# Patient Record
Sex: FEMALE | Race: WHITE | NOT HISPANIC OR LATINO | Employment: FULL TIME | URBAN - NONMETROPOLITAN AREA
[De-identification: names, ages, dates, MRNs, and addresses within clinical notes are randomized per-mention and may not be internally consistent; named-entity substitution may affect disease eponyms.]

---

## 2020-11-16 ENCOUNTER — HOSPITAL ENCOUNTER (OUTPATIENT)
Dept: MAMMOGRAPHY | Facility: HOSPITAL | Age: 50
Discharge: HOME | End: 2020-11-16
Admitting: NURSE PRACTITIONER

## 2020-11-16 DIAGNOSIS — Z12.31 VISIT FOR SCREENING MAMMOGRAM: ICD-10-CM

## 2020-11-16 PROCEDURE — 77067 SCR MAMMO BI INCL CAD: CPT | Performed by: RADIOLOGY

## 2020-11-16 PROCEDURE — 77063 BREAST TOMOSYNTHESIS BI: CPT | Performed by: RADIOLOGY

## 2020-11-16 PROCEDURE — 77063 BREAST TOMOSYNTHESIS BI: CPT

## 2020-11-16 PROCEDURE — 77067 SCR MAMMO BI INCL CAD: CPT

## 2022-06-17 ENCOUNTER — HOSPITAL ENCOUNTER (OUTPATIENT)
Dept: MAMMOGRAPHY | Facility: HOSPITAL | Age: 52
Discharge: HOME | End: 2022-06-17
Admitting: OBSTETRICS & GYNECOLOGY

## 2022-06-17 DIAGNOSIS — Z12.31 VISIT FOR SCREENING MAMMOGRAM: ICD-10-CM

## 2022-06-17 PROCEDURE — 77067 SCR MAMMO BI INCL CAD: CPT | Performed by: RADIOLOGY

## 2022-06-17 PROCEDURE — 77063 BREAST TOMOSYNTHESIS BI: CPT | Performed by: RADIOLOGY

## 2022-06-17 PROCEDURE — 77063 BREAST TOMOSYNTHESIS BI: CPT

## 2022-06-17 PROCEDURE — 77067 SCR MAMMO BI INCL CAD: CPT

## 2022-09-15 ENCOUNTER — OFFICE VISIT (OUTPATIENT)
Dept: GASTROENTEROLOGY | Facility: CLINIC | Age: 52
End: 2022-09-15

## 2022-09-15 VITALS
HEART RATE: 82 BPM | WEIGHT: 181 LBS | DIASTOLIC BLOOD PRESSURE: 97 MMHG | SYSTOLIC BLOOD PRESSURE: 113 MMHG | HEIGHT: 63 IN | OXYGEN SATURATION: 98 % | BODY MASS INDEX: 32.07 KG/M2

## 2022-09-15 DIAGNOSIS — K58.0 IRRITABLE BOWEL SYNDROME WITH DIARRHEA: Primary | ICD-10-CM

## 2022-09-15 PROCEDURE — 99204 OFFICE O/P NEW MOD 45 MIN: CPT | Performed by: INTERNAL MEDICINE

## 2022-09-15 RX ORDER — ELUXADOLINE 100 MG/1
100 TABLET, FILM COATED ORAL 2 TIMES DAILY
Qty: 60 TABLET | Refills: 5 | Status: SHIPPED | OUTPATIENT
Start: 2022-09-15 | End: 2022-11-07

## 2022-09-15 RX ORDER — DICYCLOMINE HYDROCHLORIDE 10 MG/1
10 CAPSULE ORAL EVERY 8 HOURS PRN
COMMUNITY
Start: 2022-08-02 | End: 2022-11-07 | Stop reason: SDUPTHER

## 2022-09-15 RX ORDER — ERGOCALCIFEROL 1.25 MG/1
50000 CAPSULE ORAL
COMMUNITY
Start: 2022-07-29

## 2022-09-15 RX ORDER — BETAMETHASONE DIPROPIONATE 0.5 MG/G
CREAM TOPICAL EVERY 12 HOURS
COMMUNITY
Start: 2022-05-03

## 2022-09-15 RX ORDER — ALBUTEROL SULFATE 90 UG/1
2 AEROSOL, METERED RESPIRATORY (INHALATION) EVERY 4 HOURS
COMMUNITY
Start: 2022-05-03

## 2022-09-15 RX ORDER — DIPHENOXYLATE HYDROCHLORIDE AND ATROPINE SULFATE 2.5; .025 MG/1; MG/1
1 TABLET ORAL 2 TIMES DAILY PRN
COMMUNITY
Start: 2022-08-14 | End: 2022-09-21 | Stop reason: SDUPTHER

## 2022-09-15 NOTE — PROGRESS NOTES
Subjective     Courtney Gibbs is a 52 y.o. female who presents to the office today as a consultation from DOMITILA Gonzales for evaluation of Diarrhea and Irritable Bowel Syndrome        History of Present Illness:  The patient presents for continued care of IBS-D.  She was diagnosed by her GI doctor in [place].  She has been on Lomotil since her diagnoses of IBS-D.  She states she would have urgent diarrhea with up to 5-6 bm daily.  She has 3-4 bm daily.  Her stool is now a Temple score 4.  Prior to starting Lomotil, her stool was a Temple score 5.  She no longer experiences urgency.  She is currently also taking Bentyl bid which is helpful controlling abdominal cramping.  Her colonoscopy was about 3 years ago.  She did not have colon polyps. Her colonoscopy was normal.  There is no family history of colon cancer. She denies BRBPR or melena.  She denies weight loss. Overall, she feels like she is doing well.        Review of Systems:  Review of Systems   Constitutional: Negative for fever and unexpected weight change.   HENT: Negative for sore throat and trouble swallowing.    Eyes: Negative.    Respiratory: Negative for chest tightness.    Cardiovascular: Negative for chest pain.   Gastrointestinal: Positive for diarrhea. Negative for abdominal distention, abdominal pain, anal bleeding, blood in stool, constipation, nausea, rectal pain and vomiting.   Endocrine: Negative.    Genitourinary: Negative for difficulty urinating and hematuria.   Musculoskeletal: Negative for back pain.   Skin: Negative.    Allergic/Immunologic: Negative for environmental allergies and food allergies.   Neurological: Positive for dizziness. Negative for headaches.   Hematological: Does not bruise/bleed easily.   Psychiatric/Behavioral: Negative for sleep disturbance.       Past Medical History:  Past Medical History:   Diagnosis Date   • Asthma    • Irritable bowel syndrome        Past Surgical History:  Past Surgical History:  "  Procedure Laterality Date   • AUGMENTATION MAMMAPLASTY     • COLONOSCOPY         Family History:  Family History   Problem Relation Age of Onset   • Stomach cancer Maternal Grandmother    • Breast cancer Neg Hx        Social History:  Social History     Socioeconomic History   • Marital status:    Tobacco Use   • Smoking status: Never Smoker   • Smokeless tobacco: Never Used   Substance and Sexual Activity   • Alcohol use: Yes     Comment: social   • Drug use: Never       Current Medication List:    Current Outpatient Medications:   •  albuterol sulfate  (90 Base) MCG/ACT inhaler, Inhale 2 puffs Every 4 (Four) Hours., Disp: , Rfl:   •  betamethasone dipropionate 0.05 % cream, Apply  topically to the appropriate area as directed Every 12 (Twelve) Hours., Disp: , Rfl:   •  dicyclomine (BENTYL) 10 MG capsule, Take 10 mg by mouth Every 8 (Eight) Hours As Needed., Disp: , Rfl:   •  diphenoxylate-atropine (LOMOTIL) 2.5-0.025 MG per tablet, Take 1 tablet by mouth 2 (Two) Times a Day As Needed., Disp: , Rfl:   •  vitamin D (ERGOCALCIFEROL) 1.25 MG (50791 UT) capsule capsule, Take 50,000 Units by mouth Every 7 (Seven) Days.  , Disp: , Rfl:   •  Eluxadoline (Viberzi) 100 MG tablet, Take 1 tablet by mouth 2 (Two) Times a Day., Disp: 60 tablet, Rfl: 5    Allergies:   Amoxicillin    Vitals:  /97   Pulse 82   Ht 160 cm (63\")   Wt 82.1 kg (181 lb)   SpO2 98%   BMI 32.06 kg/m²     Physical Exam:  Physical Exam  Constitutional:       Appearance: She is normal weight.   HENT:      Head: Normocephalic and atraumatic.      Nose: Nose normal. No congestion or rhinorrhea.   Eyes:      General: No scleral icterus.     Extraocular Movements: Extraocular movements intact.      Conjunctiva/sclera: Conjunctivae normal.      Pupils: Pupils are equal, round, and reactive to light.   Cardiovascular:      Rate and Rhythm: Normal rate and regular rhythm.      Pulses: Normal pulses.      Heart sounds: Normal heart sounds. "   Pulmonary:      Effort: Pulmonary effort is normal.      Breath sounds: Normal breath sounds.   Abdominal:      General: Abdomen is flat. Bowel sounds are normal. There is no distension.      Palpations: Abdomen is soft. There is no shifting dullness, fluid wave, hepatomegaly, splenomegaly, mass or pulsatile mass.      Tenderness: There is no abdominal tenderness. There is no guarding or rebound.      Hernia: No hernia is present.   Musculoskeletal:         General: No swelling or tenderness.      Cervical back: Normal range of motion and neck supple.   Skin:     General: Skin is warm and dry.      Coloration: Skin is not jaundiced.   Neurological:      General: No focal deficit present.      Mental Status: She is alert and oriented to person, place, and time.   Psychiatric:         Mood and Affect: Mood normal.         Behavior: Behavior normal.         Results Review:  Lab Results:   No visits with results within 1 Month(s) from this visit.   Latest known visit with results is:   No results found for any previous visit.       Assessment & Plan     Visit Diagnoses:    ICD-10-CM ICD-9-CM   1. Irritable bowel syndrome with diarrhea  K58.0 564.1       Plan:  The patient will try Vibezi.  If this is ineffective, I will place her back on Lomotil.  She will follow up in 8 weeks.    * Surgery not found *      MEDS ORDERED DURING VISIT:  New Medications Ordered This Visit   Medications   • Eluxadoline (Viberzi) 100 MG tablet     Sig: Take 1 tablet by mouth 2 (Two) Times a Day.     Dispense:  60 tablet     Refill:  5       Return in about 8 weeks (around 11/10/2022).             This document has been electronically signed by Monica Lozano MD   September 15, 2022 10:55 EDT        Part of this note may be an electronic transcription/translation of spoken language to printed text using the Dragon Dictation System.

## 2022-09-16 ENCOUNTER — PATIENT ROUNDING (BHMG ONLY) (OUTPATIENT)
Dept: GASTROENTEROLOGY | Facility: CLINIC | Age: 52
End: 2022-09-16

## 2022-09-16 NOTE — PROGRESS NOTES
September 16, 2022    Hello, may I speak with Courtney Gibbs?    My name is Ann Louis       I am  with [facility]  [address] [address] [phone].    Before we get started may I verify your date of birth? 1970    I am calling to officially welcome you to our practice and ask about your recent visit. Is this a good time to talk? yes    Tell me about your visit with us. What things went well?  My visit was fine everyone was really nice        We're always looking for ways to make our patients' experiences even better. Do you have recommendations on ways we may improve?  no    Overall were you satisfied with your first visit to our practice? yes       I appreciate you taking the time to speak with me today. Is there anything else I can do for you? no      Thank you, and have a great day.

## 2022-09-21 ENCOUNTER — TELEPHONE (OUTPATIENT)
Dept: GASTROENTEROLOGY | Facility: CLINIC | Age: 52
End: 2022-09-21

## 2022-09-21 DIAGNOSIS — K58.0 IRRITABLE BOWEL SYNDROME WITH DIARRHEA: Primary | ICD-10-CM

## 2022-09-21 RX ORDER — DIPHENOXYLATE HYDROCHLORIDE AND ATROPINE SULFATE 2.5; .025 MG/1; MG/1
1 TABLET ORAL 2 TIMES DAILY PRN
Qty: 60 TABLET | Refills: 5 | Status: SHIPPED | OUTPATIENT
Start: 2022-09-21

## 2022-11-07 ENCOUNTER — OFFICE VISIT (OUTPATIENT)
Dept: GASTROENTEROLOGY | Facility: CLINIC | Age: 52
End: 2022-11-07

## 2022-11-07 VITALS — HEIGHT: 63 IN | WEIGHT: 189.4 LBS | BODY MASS INDEX: 33.56 KG/M2

## 2022-11-07 DIAGNOSIS — K58.0 IRRITABLE BOWEL SYNDROME WITH DIARRHEA: Primary | ICD-10-CM

## 2022-11-07 DIAGNOSIS — R10.84 GENERALIZED ABDOMINAL PAIN: ICD-10-CM

## 2022-11-07 PROCEDURE — 99213 OFFICE O/P EST LOW 20 MIN: CPT | Performed by: PHYSICIAN ASSISTANT

## 2022-11-07 RX ORDER — DICYCLOMINE HYDROCHLORIDE 10 MG/1
10 CAPSULE ORAL EVERY 8 HOURS PRN
Qty: 60 CAPSULE | Refills: 11 | Status: SHIPPED | OUTPATIENT
Start: 2022-11-07

## 2022-11-07 NOTE — PROGRESS NOTES
Chief Complaint   Patient presents with   • Diarrhea       Courtney Gibbs is a 52 y.o. female who presents to the office today for evaluation of Diarrhea  .    HPI  The patient presents today for a follow-up on diarrhea. The patient was last seen with Dr. Mirta Landa in 09/2022.     Irritable bowel syndrome with diarrhea  The patient has been taking Lomotil and states that it has been working well for her. She takes the Lomotil twice a day and takes it in the morning. The patient notes that 2 Lomotil a day work well for her, she normally does not need a third dose. She states that she will occasionally take it in the evening when she is working the night shift at work. Previously, the patient was having 5 to 6 urgent bowel movements daily. On the Lomotil, she is now having 3 to 4 bowel movements that are type IV. The patient notes that she did not  the prescription for the Viberzi, she went to the pharmacy and it was too expensive.     Abdominal cramping  The patient currently takes Bentyl 10mg  twice a day for relief.    Colon cancer screening  The patient is current on her colonoscopy, she is not due for another 7 years.    Review of Systems   Constitutional: Negative for fever and unexpected weight change.   HENT: Negative for sore throat and trouble swallowing.    Eyes: Negative.    Respiratory: Negative for chest tightness.    Cardiovascular: Negative for chest pain.   Gastrointestinal: Negative for abdominal distention, abdominal pain, anal bleeding, blood in stool, constipation, diarrhea, nausea, rectal pain and vomiting.   Endocrine: Negative.    Genitourinary: Negative for difficulty urinating and hematuria.   Musculoskeletal: Negative for back pain.   Skin: Negative.    Allergic/Immunologic: Negative for environmental allergies and food allergies.   Neurological: Positive for dizziness. Negative for headaches.   Hematological: Does not bruise/bleed easily.   Psychiatric/Behavioral: Negative for  "sleep disturbance.       ACTIVE PROBLEMS:   Specialty Problems    None      PAST MEDICAL HISTORY:  Past Medical History:   Diagnosis Date   • Asthma    • Irritable bowel syndrome        SURGICAL HISTORY:  Past Surgical History:   Procedure Laterality Date   • AUGMENTATION MAMMAPLASTY     • COLONOSCOPY         FAMILY HISTORY:  Family History   Problem Relation Age of Onset   • Stomach cancer Maternal Grandmother    • Breast cancer Neg Hx        SOCIAL HISTORY:  Social History     Tobacco Use   • Smoking status: Never   • Smokeless tobacco: Never   Substance Use Topics   • Alcohol use: Yes     Comment: social       CURRENT MEDICATION:    Current Outpatient Medications:   •  albuterol sulfate  (90 Base) MCG/ACT inhaler, Inhale 2 puffs Every 4 (Four) Hours., Disp: , Rfl:   •  betamethasone dipropionate 0.05 % cream, Apply  topically to the appropriate area as directed Every 12 (Twelve) Hours., Disp: , Rfl:   •  dicyclomine (BENTYL) 10 MG capsule, Take 1 capsule by mouth Every 8 (Eight) Hours As Needed (abdominal pain)., Disp: 60 capsule, Rfl: 11  •  diphenoxylate-atropine (LOMOTIL) 2.5-0.025 MG per tablet, Take 1 tablet by mouth 2 (Two) Times a Day As Needed for Diarrhea., Disp: 60 tablet, Rfl: 5  •  vitamin D (ERGOCALCIFEROL) 1.25 MG (52839 UT) capsule capsule, Take 50,000 Units by mouth Every 7 (Seven) Days.  , Disp: , Rfl:     ALLERGIES:  Amoxicillin    VISIT VITALS:  Ht 160 cm (63\")   Wt 85.9 kg (189 lb 6.4 oz)   BMI 33.55 kg/m²   Physical Exam  Constitutional:       General: She is not in acute distress.     Appearance: Normal appearance. She is well-developed.   HENT:      Head: Normocephalic and atraumatic.   Eyes:      Pupils: Pupils are equal, round, and reactive to light.   Cardiovascular:      Rate and Rhythm: Normal rate and regular rhythm.      Heart sounds: Normal heart sounds.   Pulmonary:      Effort: Pulmonary effort is normal. No respiratory distress.      Breath sounds: Normal breath sounds. " No wheezing, rhonchi or rales.   Abdominal:      General: Abdomen is flat. Bowel sounds are normal. There is no distension.      Palpations: Abdomen is soft. There is no mass.      Tenderness: There is no abdominal tenderness. There is no guarding or rebound.      Hernia: No hernia is present.   Musculoskeletal:         General: No swelling. Normal range of motion.      Cervical back: Normal range of motion and neck supple.      Right lower leg: No edema.      Left lower leg: No edema.   Skin:     General: Skin is warm and dry.   Neurological:      Mental Status: She is alert and oriented to person, place, and time.   Psychiatric:         Attention and Perception: Attention normal.         Mood and Affect: Mood normal.         Speech: Speech normal.         Behavior: Behavior normal. Behavior is cooperative.         Thought Content: Thought content normal.         Assessment    1. Irritable bowel syndrome with diarrhea  - The patient is doing well on the Lomotil. She will contact our office when she needs a refill.     2. Abdominal cramping  - The patient is doing well on the Bentyl.   - A refill of the Bentyl 10mg was sent to the patient's preferred pharmacy.     The patient will contact our office when she needs refills on her medications or if she has any further questions or concerns. The patient will return for a follow-up in 1 year.        Diagnosis Plan   1. Irritable bowel syndrome with diarrhea  dicyclomine (BENTYL) 10 MG capsule      2. Generalized abdominal pain            Return in about 1 year (around 11/7/2023).                   This document has been electronically signed by Angelo Montanez PA-C  November 7, 2022 12:48 EST    Part of this note may be an electronic transcription/translation of spoken language to printed text using the Dragon Dictation System.    Transcribed from ambient dictation for Angelo Montanez PA-C by Frances Chun.  11/07/22   11:05 EST    Patient or patient  representative verbalized consent to the visit recording.  I have personally performed the services described in this document as transcribed by the above individual, and it is both accurate and complete.

## 2022-11-21 ENCOUNTER — OFFICE VISIT (OUTPATIENT)
Dept: SURGERY | Facility: CLINIC | Age: 52
End: 2022-11-21

## 2022-11-21 VITALS
HEART RATE: 80 BPM | HEIGHT: 63 IN | DIASTOLIC BLOOD PRESSURE: 80 MMHG | WEIGHT: 184.6 LBS | BODY MASS INDEX: 32.71 KG/M2 | SYSTOLIC BLOOD PRESSURE: 132 MMHG

## 2022-11-21 DIAGNOSIS — T85.44XA CAPSULAR CONTRACTURE OF BREAST IMPLANT, INITIAL ENCOUNTER: Primary | ICD-10-CM

## 2022-11-21 PROCEDURE — 99203 OFFICE O/P NEW LOW 30 MIN: CPT | Performed by: SURGERY

## 2022-11-21 NOTE — PROGRESS NOTES
"Subjective   Courtney Gibbs is a 52 y.o. female here today because right breast is hard.    History of Present Illness  Ms. Gibbs was seen in the office today for breast evaluation.  This is a 52-year-old female who initially had breast augmentation 2006 with subsequent redo of her implants in 2014.  The right breast was harder than the left.  The result was not noticeable in the standing position but was more noticeable when in the supine position.  States more recently it has looked better.  No previous breast surgery noted other than the implants.  Patient did have a mammogram on 6/17/2022 which demonstrated no evidence of malignancy.  No family history of breast or ovarian cancer.  Allergies   Allergen Reactions   • Amoxicillin Rash     Current Outpatient Medications   Medication Sig Dispense Refill   • albuterol sulfate  (90 Base) MCG/ACT inhaler Inhale 2 puffs Every 4 (Four) Hours.     • betamethasone dipropionate 0.05 % cream Apply  topically to the appropriate area as directed Every 12 (Twelve) Hours.     • dicyclomine (BENTYL) 10 MG capsule Take 1 capsule by mouth Every 8 (Eight) Hours As Needed (abdominal pain). 60 capsule 11   • diphenoxylate-atropine (LOMOTIL) 2.5-0.025 MG per tablet Take 1 tablet by mouth 2 (Two) Times a Day As Needed for Diarrhea. 60 tablet 5   • vitamin D (ERGOCALCIFEROL) 1.25 MG (09147 UT) capsule capsule Take 50,000 Units by mouth Every 7 (Seven) Days.         No current facility-administered medications for this visit.     Past Medical History:   Diagnosis Date   • Asthma    • Irritable bowel syndrome      Past Surgical History:   Procedure Laterality Date   • AUGMENTATION MAMMAPLASTY     • COLONOSCOPY         Pertinent Review of Systems:  Respiratory: no shortness of breath  Cardiovascular: no chest pain  Other pertinent:      Objective   /80 (BP Location: Left arm)   Pulse 80   Ht 160 cm (63\")   Wt 83.7 kg (184 lb 9.6 oz)   BMI 32.70 kg/m²   Physical " Exam  Well-developed well-nourished female  Neck:  No supraclavicular adenopathy  Lungs:  Respiratory effort normal. Auscultation: Clear, without wheezes, rhonchi, rales  Heart:  Regular rate and rhythm, without murmur, gallop, rub.  No pedal edema  Breasts: On visual inspection the breasts are symmetrical with the patient in the sitting position.  In the lying position the left breast is significantly flatter in appearance than the right.  Examination of the right breast demonstrates no palpable mass, skin change, or axillary adenopathy.  The capsule of the implant is significantly firmer than the contralateral side..  Examination of the left breast demonstrates no palpable mass or adenopathy..       Procedures     Results/Data:  Imaging: Mammogram reports and images were reviewed.  I agree with the assessment      Assessment & Plan   Clinical findings consistent with capsular contraction of the right breast.  It is not clear why this appears to be evolving now, 8 years since the implants were last replaced.  I do not see any clinical or radiographic suspicious findings.    Continue routine breast surveillance.  Follow-up with me as needed         Discussion/Summary    Time spent:     BMI is >= 30 and <35. (Class 1 Obesity). The following options were offered after discussion;: exercise counseling/recommendations       Future Appointments   Date Time Provider Department Center   11/7/2023  9:30 AM Angelo Montanez PA-C [facility]         Please note that portions of this note were completed with a voice recognition program.

## 2023-06-02 ENCOUNTER — TRANSCRIBE ORDERS (OUTPATIENT)
Dept: ADMINISTRATIVE | Facility: HOSPITAL | Age: 53
End: 2023-06-02
Payer: COMMERCIAL

## 2023-06-02 DIAGNOSIS — Z12.31 SCREENING MAMMOGRAM FOR BREAST CANCER: Primary | ICD-10-CM

## 2023-06-19 ENCOUNTER — TRANSCRIBE ORDERS (OUTPATIENT)
Dept: ADMINISTRATIVE | Facility: HOSPITAL | Age: 53
End: 2023-06-19
Payer: COMMERCIAL

## 2023-06-19 DIAGNOSIS — M79.605 LEFT LEG PAIN: Primary | ICD-10-CM

## 2023-08-31 ENCOUNTER — HOSPITAL ENCOUNTER (OUTPATIENT)
Dept: MAMMOGRAPHY | Facility: HOSPITAL | Age: 53
Discharge: HOME | End: 2023-08-31
Admitting: NURSE PRACTITIONER
Payer: COMMERCIAL

## 2023-08-31 DIAGNOSIS — Z12.31 ENCOUNTER FOR SCREENING MAMMOGRAM FOR MALIGNANT NEOPLASM OF BREAST: ICD-10-CM

## 2023-08-31 PROCEDURE — 77067 SCR MAMMO BI INCL CAD: CPT

## 2023-08-31 PROCEDURE — 77063 BREAST TOMOSYNTHESIS BI: CPT

## 2024-09-11 ENCOUNTER — TRANSCRIBE ORDERS (OUTPATIENT)
Dept: ADMINISTRATIVE | Facility: HOSPITAL | Age: 54
End: 2024-09-11
Payer: COMMERCIAL

## 2024-09-11 DIAGNOSIS — Z12.31 VISIT FOR SCREENING MAMMOGRAM: Primary | ICD-10-CM

## 2024-10-14 ENCOUNTER — HOSPITAL ENCOUNTER (OUTPATIENT)
Dept: MAMMOGRAPHY | Facility: HOSPITAL | Age: 54
Discharge: HOME | End: 2024-10-14
Admitting: NURSE PRACTITIONER
Payer: COMMERCIAL

## 2024-10-14 DIAGNOSIS — Z12.31 VISIT FOR SCREENING MAMMOGRAM: ICD-10-CM

## 2024-10-14 PROCEDURE — 77063 BREAST TOMOSYNTHESIS BI: CPT

## 2024-10-14 PROCEDURE — 77063 BREAST TOMOSYNTHESIS BI: CPT | Performed by: RADIOLOGY

## 2024-10-14 PROCEDURE — 77067 SCR MAMMO BI INCL CAD: CPT

## 2024-10-14 PROCEDURE — 77067 SCR MAMMO BI INCL CAD: CPT | Performed by: RADIOLOGY
